# Patient Record
Sex: MALE | Employment: UNEMPLOYED | URBAN - NONMETROPOLITAN AREA
[De-identification: names, ages, dates, MRNs, and addresses within clinical notes are randomized per-mention and may not be internally consistent; named-entity substitution may affect disease eponyms.]

---

## 2024-08-14 ENCOUNTER — HOSPITAL ENCOUNTER (EMERGENCY)
Facility: HOSPITAL | Age: 69
Discharge: HOME | End: 2024-08-14
Attending: EMERGENCY MEDICINE
Payer: COMMERCIAL

## 2024-08-14 ENCOUNTER — APPOINTMENT (EMERGENCY)
Dept: CT IMAGING | Facility: HOSPITAL | Age: 69
End: 2024-08-14
Payer: COMMERCIAL

## 2024-08-14 VITALS
SYSTOLIC BLOOD PRESSURE: 128 MMHG | RESPIRATION RATE: 20 BRPM | TEMPERATURE: 98.6 F | OXYGEN SATURATION: 95 % | HEART RATE: 85 BPM | DIASTOLIC BLOOD PRESSURE: 80 MMHG

## 2024-08-14 DIAGNOSIS — K59.00 CONSTIPATION: ICD-10-CM

## 2024-08-14 DIAGNOSIS — R59.9 ENLARGED LYMPH NODES: ICD-10-CM

## 2024-08-14 DIAGNOSIS — K62.5 BRBPR (BRIGHT RED BLOOD PER RECTUM): Primary | ICD-10-CM

## 2024-08-14 DIAGNOSIS — K40.20 INGUINAL HERNIA, BILATERAL: ICD-10-CM

## 2024-08-14 DIAGNOSIS — N40.0 ENLARGED PROSTATE: ICD-10-CM

## 2024-08-14 PROBLEM — I10 BENIGN ESSENTIAL HTN: Status: ACTIVE | Noted: 2019-01-05

## 2024-08-14 PROBLEM — M25.541 JOINT PAIN IN FINGERS OF BOTH HANDS: Status: ACTIVE | Noted: 2020-01-23

## 2024-08-14 PROBLEM — M25.512 CHRONIC LEFT SHOULDER PAIN: Status: ACTIVE | Noted: 2020-01-23

## 2024-08-14 PROBLEM — M16.12 OSTEOARTHRITIS OF LEFT HIP: Status: ACTIVE | Noted: 2023-06-16

## 2024-08-14 PROBLEM — I10 HTN (HYPERTENSION): Status: ACTIVE | Noted: 2024-08-14

## 2024-08-14 PROBLEM — R97.20 ELEVATED PSA, BETWEEN 10 AND LESS THAN 20 NG/ML: Status: ACTIVE | Noted: 2020-06-09

## 2024-08-14 PROBLEM — G89.29 CHRONIC LEFT SHOULDER PAIN: Status: ACTIVE | Noted: 2020-01-23

## 2024-08-14 PROBLEM — R33.9 URINARY RETENTION: Status: ACTIVE | Noted: 2019-01-05

## 2024-08-14 PROBLEM — Z96.649 STATUS POST THR (TOTAL HIP REPLACEMENT): Status: ACTIVE | Noted: 2023-06-17

## 2024-08-14 PROBLEM — Z22.358 ESBL E. COLI CARRIER: Status: ACTIVE | Noted: 2023-05-12

## 2024-08-14 PROBLEM — M15.4 EROSIVE (OSTEO)ARTHRITIS: Status: ACTIVE | Noted: 2023-05-11

## 2024-08-14 PROBLEM — D64.9 ANEMIA: Status: ACTIVE | Noted: 2023-06-17

## 2024-08-14 PROBLEM — N20.2 CALCULUS OF KIDNEY WITH CALCULUS OF URETER: Status: ACTIVE | Noted: 2017-07-30

## 2024-08-14 PROBLEM — M25.542 JOINT PAIN IN FINGERS OF BOTH HANDS: Status: ACTIVE | Noted: 2020-01-23

## 2024-08-14 PROBLEM — R31.0 GROSS HEMATURIA: Status: ACTIVE | Noted: 2017-07-30

## 2024-08-14 LAB
2HR DELTA HS TROPONIN: -1 NG/L
ABO GROUP BLD: NORMAL
ABO GROUP BLD: NORMAL
ALBUMIN SERPL BCG-MCNC: 4.6 G/DL (ref 3.5–5)
ALP SERPL-CCNC: 58 U/L (ref 34–104)
ALT SERPL W P-5'-P-CCNC: 22 U/L (ref 7–52)
ANION GAP SERPL CALCULATED.3IONS-SCNC: 10 MMOL/L (ref 4–13)
APTT PPP: 28 SECONDS (ref 23–34)
AST SERPL W P-5'-P-CCNC: 18 U/L (ref 13–39)
BASOPHILS # BLD AUTO: 0.06 THOUSANDS/ÂΜL (ref 0–0.1)
BASOPHILS NFR BLD AUTO: 1 % (ref 0–1)
BILIRUB SERPL-MCNC: 0.74 MG/DL (ref 0.2–1)
BLD GP AB SCN SERPL QL: NEGATIVE
BUN SERPL-MCNC: 14 MG/DL (ref 5–25)
CALCIUM SERPL-MCNC: 9.4 MG/DL (ref 8.4–10.2)
CARDIAC TROPONIN I PNL SERPL HS: 3 NG/L
CARDIAC TROPONIN I PNL SERPL HS: 4 NG/L
CHLORIDE SERPL-SCNC: 102 MMOL/L (ref 96–108)
CO2 SERPL-SCNC: 27 MMOL/L (ref 21–32)
CREAT SERPL-MCNC: 0.82 MG/DL (ref 0.6–1.3)
EOSINOPHIL # BLD AUTO: 0.07 THOUSAND/ÂΜL (ref 0–0.61)
EOSINOPHIL NFR BLD AUTO: 1 % (ref 0–6)
ERYTHROCYTE [DISTWIDTH] IN BLOOD BY AUTOMATED COUNT: 14 % (ref 11.6–15.1)
GFR SERPL CREATININE-BSD FRML MDRD: 91 ML/MIN/1.73SQ M
GLUCOSE SERPL-MCNC: 105 MG/DL (ref 65–140)
HCT VFR BLD AUTO: 50.8 % (ref 36.5–49.3)
HGB BLD-MCNC: 16.5 G/DL (ref 12–17)
IMM GRANULOCYTES # BLD AUTO: 0.01 THOUSAND/UL (ref 0–0.2)
IMM GRANULOCYTES NFR BLD AUTO: 0 % (ref 0–2)
INR PPP: 0.91 (ref 0.85–1.19)
LYMPHOCYTES # BLD AUTO: 1.52 THOUSANDS/ÂΜL (ref 0.6–4.47)
LYMPHOCYTES NFR BLD AUTO: 22 % (ref 14–44)
MCH RBC QN AUTO: 28.7 PG (ref 26.8–34.3)
MCHC RBC AUTO-ENTMCNC: 32.5 G/DL (ref 31.4–37.4)
MCV RBC AUTO: 89 FL (ref 82–98)
MONOCYTES # BLD AUTO: 0.75 THOUSAND/ÂΜL (ref 0.17–1.22)
MONOCYTES NFR BLD AUTO: 11 % (ref 4–12)
NEUTROPHILS # BLD AUTO: 4.65 THOUSANDS/ÂΜL (ref 1.85–7.62)
NEUTS SEG NFR BLD AUTO: 65 % (ref 43–75)
NRBC BLD AUTO-RTO: 0 /100 WBCS
PLATELET # BLD AUTO: 244 THOUSANDS/UL (ref 149–390)
PMV BLD AUTO: 10.6 FL (ref 8.9–12.7)
POTASSIUM SERPL-SCNC: 3.7 MMOL/L (ref 3.5–5.3)
PROT SERPL-MCNC: 7.7 G/DL (ref 6.4–8.4)
PROTHROMBIN TIME: 12.8 SECONDS (ref 12.3–15)
RBC # BLD AUTO: 5.74 MILLION/UL (ref 3.88–5.62)
RH BLD: POSITIVE
RH BLD: POSITIVE
SODIUM SERPL-SCNC: 139 MMOL/L (ref 135–147)
SPECIMEN EXPIRATION DATE: NORMAL
WBC # BLD AUTO: 7.06 THOUSAND/UL (ref 4.31–10.16)

## 2024-08-14 PROCEDURE — 96368 THER/DIAG CONCURRENT INF: CPT

## 2024-08-14 PROCEDURE — 84484 ASSAY OF TROPONIN QUANT: CPT | Performed by: PHYSICIAN ASSISTANT

## 2024-08-14 PROCEDURE — 86850 RBC ANTIBODY SCREEN: CPT | Performed by: PHYSICIAN ASSISTANT

## 2024-08-14 PROCEDURE — 93005 ELECTROCARDIOGRAM TRACING: CPT

## 2024-08-14 PROCEDURE — 99285 EMERGENCY DEPT VISIT HI MDM: CPT | Performed by: PHYSICIAN ASSISTANT

## 2024-08-14 PROCEDURE — 85610 PROTHROMBIN TIME: CPT | Performed by: PHYSICIAN ASSISTANT

## 2024-08-14 PROCEDURE — 86900 BLOOD TYPING SEROLOGIC ABO: CPT | Performed by: PHYSICIAN ASSISTANT

## 2024-08-14 PROCEDURE — 86901 BLOOD TYPING SEROLOGIC RH(D): CPT | Performed by: PHYSICIAN ASSISTANT

## 2024-08-14 PROCEDURE — 85025 COMPLETE CBC W/AUTO DIFF WBC: CPT | Performed by: PHYSICIAN ASSISTANT

## 2024-08-14 PROCEDURE — 36415 COLL VENOUS BLD VENIPUNCTURE: CPT | Performed by: PHYSICIAN ASSISTANT

## 2024-08-14 PROCEDURE — 99285 EMERGENCY DEPT VISIT HI MDM: CPT

## 2024-08-14 PROCEDURE — 96365 THER/PROPH/DIAG IV INF INIT: CPT

## 2024-08-14 PROCEDURE — 74178 CT ABD&PLV WO CNTR FLWD CNTR: CPT

## 2024-08-14 PROCEDURE — 80053 COMPREHEN METABOLIC PANEL: CPT | Performed by: PHYSICIAN ASSISTANT

## 2024-08-14 PROCEDURE — 85730 THROMBOPLASTIN TIME PARTIAL: CPT | Performed by: PHYSICIAN ASSISTANT

## 2024-08-14 PROCEDURE — 96366 THER/PROPH/DIAG IV INF ADDON: CPT

## 2024-08-14 RX ORDER — FINASTERIDE 5 MG/1
5 TABLET, FILM COATED ORAL DAILY
COMMUNITY
Start: 2024-07-30 | End: 2025-07-30

## 2024-08-14 RX ORDER — SODIUM CHLORIDE, SODIUM GLUCONATE, SODIUM ACETATE, POTASSIUM CHLORIDE, MAGNESIUM CHLORIDE, SODIUM PHOSPHATE, DIBASIC, AND POTASSIUM PHOSPHATE .53; .5; .37; .037; .03; .012; .00082 G/100ML; G/100ML; G/100ML; G/100ML; G/100ML; G/100ML; G/100ML
1000 INJECTION, SOLUTION INTRAVENOUS ONCE
Status: COMPLETED | OUTPATIENT
Start: 2024-08-14 | End: 2024-08-14

## 2024-08-14 RX ORDER — IRBESARTAN AND HYDROCHLOROTHIAZIDE 150; 12.5 MG/1; MG/1
1 TABLET, FILM COATED ORAL EVERY MORNING
COMMUNITY

## 2024-08-14 RX ORDER — DOCUSATE SODIUM 100 MG/1
100 CAPSULE, LIQUID FILLED ORAL EVERY 12 HOURS
Qty: 60 CAPSULE | Refills: 0 | Status: SHIPPED | OUTPATIENT
Start: 2024-08-14

## 2024-08-14 RX ORDER — POLYETHYLENE GLYCOL 3350 17 G/17G
17 POWDER, FOR SOLUTION ORAL DAILY
Qty: 510 G | Refills: 0 | Status: SHIPPED | OUTPATIENT
Start: 2024-08-14

## 2024-08-14 RX ADMIN — IOHEXOL 100 ML: 350 INJECTION, SOLUTION INTRAVENOUS at 11:46

## 2024-08-14 RX ADMIN — PANTOPRAZOLE SODIUM 80 MG: 40 INJECTION, POWDER, LYOPHILIZED, FOR SOLUTION INTRAVENOUS at 11:39

## 2024-08-14 RX ADMIN — SODIUM CHLORIDE, SODIUM GLUCONATE, SODIUM ACETATE, POTASSIUM CHLORIDE, MAGNESIUM CHLORIDE, SODIUM PHOSPHATE, DIBASIC, AND POTASSIUM PHOSPHATE 1000 ML: .53; .5; .37; .037; .03; .012; .00082 INJECTION, SOLUTION INTRAVENOUS at 11:16

## 2024-08-14 NOTE — DISCHARGE INSTRUCTIONS
I have placed a referral to GI for follow up.  Increase fiber and fluids in the diet.  Take stool softener and miralax to help with constipation.  Follow up with your PCP and urologist as well.

## 2024-08-14 NOTE — INCIDENTAL FINDINGS
The following findings require follow up:  Radiographic finding   Finding:   -Mildly enlarged/prominent mesorectal and pelvic sidewall lymph nodes as described, indeterminate. If not recently performed, colonoscopy may be useful to rule out occult colorectal neoplasm.  -Markedly enlarged prostate.  -Moderate bilateral fat-containing inguinal hernias.  -Partially imaged right hydrocele.   Follow up required: GI referral, follow up with his urologist   Follow up should be done within 2 week(s)    Please notify the following clinician to assist with the follow up:  -GI office   -Dr. Kapoor (his urologist)    Incidental finding results were discussed with the Patient by Isatu Solis PA-C on 08/14/24.   They expressed understanding and all questions answered.

## 2024-08-14 NOTE — ED PROVIDER NOTES
History  Chief Complaint   Patient presents with    Rectal Bleeding     Sent here from [facility] for Eval, states that he has been having blood in his stools for the past 15 days, states that he had a colonoscopy done 2 years ago and was told that he has precancerous polyps.  Feels dizzy on occasion. Denies any pain. Denies any nausea vomiting or diarrhea.      69 year old male with PMH HTN, BPH, h/o ESBL E.coli presenting ambulatory from home for evaluation of blood in his stool.  History obtained through [employer] translation service.  He reports this has been occurring with each bowel movement over the past 15 days.  He notes most recent episode was this morning.  Shows me a picture on his cell phone in which there was a small lump of brown stool but with bright red tinged water noted in water around it.  He denies nausea, vomiting, diarrhea, constipation or abdominal pain.  Denies fever, chills.  Denies chest pain, SOB.  Denies cough, congestion or recent illness.  He notes he experiences intermittent dizziness at times.  No reported syncope.  No reported recent falls.  He denies aspirin or blood thinners.  Denies any difficultly urinating since having his prostate surgery.  He does follow with urology and saw them last month.  No noted hematuria.  He notes having a colonoscopy about 2 years ago in [place] and was told he had polyps at that time.  He denies any hearburn or reflux but reports he had a sour taste in his mouth earlier today.  No reported aggravating or alleviating factors.  He reports his PCP advised him to come to ER for further evaluation.      History provided by:  Patient and medical records   used: Yes        Prior to Admission Medications   Prescriptions Last Dose Informant Patient Reported? Taking?   finasteride (PROSCAR) 5 mg tablet   Yes Yes   Sig: Take 5 mg by mouth daily   irbesartan-hydrochlorothiazide (AVALIDE) 150-12.5 MG per tablet   Yes No   Sig: Take 1 tablet by  mouth every morning      Facility-Administered Medications: None       History reviewed. No pertinent past medical history.    History reviewed. No pertinent surgical history.    History reviewed. No pertinent family history.  I have reviewed and agree with the history as documented.    E-Cigarette/Vaping     E-Cigarette/Vaping Substances     Social History     Tobacco Use    Smoking status: Never    Smokeless tobacco: Never       Review of Systems   Constitutional: Negative.  Negative for chills, fatigue and fever.   HENT: Negative.  Negative for congestion, rhinorrhea and sore throat.    Eyes: Negative.  Negative for visual disturbance.   Respiratory: Negative.  Negative for cough, shortness of breath and wheezing.    Cardiovascular: Negative.  Negative for chest pain, palpitations and leg swelling.   Gastrointestinal:  Positive for blood in stool. Negative for abdominal pain, constipation, diarrhea, nausea and vomiting.   Genitourinary: Negative.  Negative for dysuria, flank pain, frequency and hematuria.   Musculoskeletal: Negative.  Negative for back pain and neck pain.   Skin: Negative.  Negative for rash.   Neurological:  Positive for dizziness and light-headedness. Negative for syncope, numbness and headaches.   Psychiatric/Behavioral: Negative.     All other systems reviewed and are negative.      Physical Exam  Physical Exam  Vitals and nursing note reviewed. Exam conducted with a chaperone present (Constanza SMITH).   Constitutional:       General: He is awake. He is not in acute distress.     Appearance: Normal appearance. He is well-developed. He is obese. He is not toxic-appearing or diaphoretic.   HENT:      Head: Normocephalic and atraumatic.      Right Ear: Hearing and external ear normal.      Left Ear: Hearing and external ear normal.      Nose: Nose normal.      Mouth/Throat:      Mouth: Mucous membranes are moist.      Pharynx: Oropharynx is clear. Uvula midline.   Eyes:      General: Lids are  normal. No scleral icterus.     Conjunctiva/sclera: Conjunctivae normal.      Pupils: Pupils are equal, round, and reactive to light.   Neck:      Trachea: Trachea and phonation normal.   Cardiovascular:      Rate and Rhythm: Normal rate and regular rhythm.      Pulses: Normal pulses.           Radial pulses are 2+ on the right side and 2+ on the left side.        Dorsalis pedis pulses are 2+ on the right side and 2+ on the left side.        Posterior tibial pulses are 2+ on the right side and 2+ on the left side.      Heart sounds: Normal heart sounds, S1 normal and S2 normal. No murmur heard.  Pulmonary:      Effort: Pulmonary effort is normal. No tachypnea or respiratory distress.      Breath sounds: Normal breath sounds. No wheezing, rhonchi or rales.   Abdominal:      General: Bowel sounds are normal. There is no distension.      Palpations: Abdomen is soft.      Tenderness: There is no abdominal tenderness. There is no right CVA tenderness, left CVA tenderness, guarding or rebound.   Genitourinary:     Rectum: External hemorrhoid present. No anal fissure. Normal anal tone.      Comments: No noted external hemorrhage or overt bleeding.  There are non-thrombosed external hemorrhoids.  No noted melena or hematochezia but there was trace positive guaiac on hemoccult bedside testing, controls intact.  Musculoskeletal:      Cervical back: Normal range of motion and neck supple.      Right lower leg: No edema.      Left lower leg: No edema.   Skin:     General: Skin is warm and dry.      Capillary Refill: Capillary refill takes less than 2 seconds.      Findings: No rash.   Neurological:      General: No focal deficit present.      Mental Status: He is alert and oriented to person, place, and time.      GCS: GCS eye subscore is 4. GCS verbal subscore is 5. GCS motor subscore is 6.      Gait: Gait normal.   Psychiatric:         Mood and Affect: Mood normal.         Speech: Speech normal.         Behavior: Behavior  normal. Behavior is cooperative.         Vital Signs  ED Triage Vitals [08/14/24 1042]   Temperature Pulse Respirations Blood Pressure SpO2   98.6 °F (37 °C) 83 18 139/85 95 %      Temp Source Heart Rate Source Patient Position - Orthostatic VS BP Location FiO2 (%)   Temporal Monitor Sitting Right arm --      Pain Score       No Pain           Vitals:    08/14/24 1042 08/14/24 1215 08/14/24 1230 08/14/24 1300   BP: 139/85 126/66 147/86 128/80   Pulse: 83 86 80 85   Patient Position - Orthostatic VS: Sitting  Sitting Sitting         Visual Acuity      ED Medications  Medications   multi-electrolyte (ISOLYTE-S PH 7.4) bolus 1,000 mL (0 mL Intravenous Stopped 8/14/24 1249)   pantoprazole (PROTONIX) 80 mg in sodium chloride 0.9 % 100 mL IVPB (0 mg Intravenous Stopped 8/14/24 1213)   iohexol (OMNIPAQUE) 350 MG/ML injection (MULTI-DOSE) 100 mL (100 mL Intravenous Given 8/14/24 1146)       Diagnostic Studies  Results Reviewed       Procedure Component Value Units Date/Time    HS Troponin I 2hr [[id number]]  (Normal) Collected: 08/14/24 1318    Lab Status: Final result Specimen: Blood from Arm, Left Updated: 08/14/24 1347     hs TnI 2hr 3 ng/L      Delta 2hr hsTnI -1 ng/L     HS Troponin I 4hr [[id number]]     Lab Status: No result Specimen: Blood     HS Troponin 0hr (reflex protocol) [[id number]]  (Normal) Collected: 08/14/24 1110    Lab Status: Final result Specimen: Blood from Arm, Left Updated: 08/14/24 1143     hs TnI 0hr 4 ng/L     Comprehensive metabolic panel [[id number]] Collected: 08/14/24 1110    Lab Status: Final result Specimen: Blood from Arm, Left Updated: 08/14/24 1138     Sodium 139 mmol/L      Potassium 3.7 mmol/L      Chloride 102 mmol/L      CO2 27 mmol/L      ANION GAP 10 mmol/L      BUN 14 mg/dL      Creatinine 0.82 mg/dL      Glucose 105 mg/dL      Calcium 9.4 mg/dL      AST 18 U/L      ALT 22 U/L      Alkaline Phosphatase 58 U/L      Total Protein 7.7 g/dL      Albumin 4.6 g/dL      Total Bilirubin 0.74  mg/dL      eGFR 91 ml/min/1.73sq m     Narrative:      National Kidney Disease Foundation guidelines for Chronic Kidney Disease (CKD):     Stage 1 with normal or high GFR (GFR > 90 mL/min/1.73 square meters)    Stage 2 Mild CKD (GFR = 60-89 mL/min/1.73 square meters)    Stage 3A Moderate CKD (GFR = 45-59 mL/min/1.73 square meters)    Stage 3B Moderate CKD (GFR = 30-44 mL/min/1.73 square meters)    Stage 4 Severe CKD (GFR = 15-29 mL/min/1.73 square meters)    Stage 5 End Stage CKD (GFR <15 mL/min/1.73 square meters)  Note: GFR calculation is accurate only with a steady state creatinine    Protime-INR [[id number]]  (Normal) Collected: 08/14/24 1110    Lab Status: Final result Specimen: Blood from Arm, Left Updated: 08/14/24 1131     Protime 12.8 seconds      INR 0.91    Narrative:      INR Therapeutic Range    Indication                                             INR Range      Atrial Fibrillation                                               2.0-3.0  Hypercoagulable State                                    2.0.2.3  Left Ventricular Asist Device                            2.0-3.0  Mechanical Heart Valve                                  -    Aortic(with afib, MI, embolism, HF, LA enlargement,    and/or coagulopathy)                                     2.0-3.0 (2.5-3.5)     Mitral                                                             2.5-3.5  Prosthetic/Bioprosthetic Heart Valve               2.0-3.0  Venous thromboembolism (VTE: VT, PE        2.0-3.0    APTT [[id number]]  (Normal) Collected: 08/14/24 1110    Lab Status: Final result Specimen: Blood from Arm, Left Updated: 08/14/24 1131     PTT 28 seconds     CBC and differential [[id number]]  (Abnormal) Collected: 08/14/24 1110    Lab Status: Final result Specimen: Blood from Arm, Left Updated: 08/14/24 1118     WBC 7.06 Thousand/uL      RBC 5.74 Million/uL      Hemoglobin 16.5 g/dL      Hematocrit 50.8 %      MCV 89 fL      MCH 28.7 pg      MCHC 32.5 g/dL      RDW  14.0 %      MPV 10.6 fL      Platelets 244 Thousands/uL      nRBC 0 /100 WBCs      Segmented % 65 %      Immature Grans % 0 %      Lymphocytes % 22 %      Monocytes % 11 %      Eosinophils Relative 1 %      Basophils Relative 1 %      Absolute Neutrophils 4.65 Thousands/µL      Absolute Immature Grans 0.01 Thousand/uL      Absolute Lymphocytes 1.52 Thousands/µL      Absolute Monocytes 0.75 Thousand/µL      Eosinophils Absolute 0.07 Thousand/µL      Basophils Absolute 0.06 Thousands/µL                    CT high volume bleeding scan abdomen pelvis   Final Result by Raf Xiao MD (08/14 1236)      1.  No CT evidence of active high-volume gastrointestinal hemorrhage.   2.  Moderate stool burden within the distal sigmoid colon and rectum suggestive of constipation.   3.  Mildly enlarged/prominent mesorectal and pelvic sidewall lymph nodes as described, indeterminate. If not recently performed, colonoscopy may be useful to rule out occult colorectal neoplasm.   4.  Markedly enlarged prostate.   5.  Moderate bilateral fat-containing inguinal hernias.   6.  Partially imaged right hydrocele.      The study was marked in EPIC for immediate notification.      Workstation performed: [id number]                    Procedures  ECG 12 Lead Documentation Only    Date/Time: 8/14/2024 11:12 AM    Performed by: Isatu Solis PA-C  Authorized by: Isatu Solis PA-C    Indications / Diagnosis:  Dizziness  ECG reviewed by me, the ED Provider: yes    Patient location:  ED  Previous ECG:     Comparison to cardiac monitor: Yes    Interpretation:     Interpretation: normal    Rate:     ECG rate:  81    ECG rate assessment: normal    Rhythm:     Rhythm: sinus rhythm    Ectopy:     Ectopy: none    QRS:     QRS axis:  Normal    QRS intervals:  Normal  Conduction:     Conduction: normal    ST segments:     ST segments:  Normal  T waves:     T waves: normal    Comments:      , QRS 84, QT//450; no acute ischemic  changes.           ED Course  ED Course as of 08/14/24 1602   Wed Aug 14, 2024   1046 #[id number]   1121 WBC: 7.06   1121 Hemoglobin: 16.5  Stable, improved.  Value of 11.6 a year ago; on outside labs value was 15.7 on 7/12/24.   1122 Platelet Count: 244   1132 POCT INR: 0.91   1132 PROTIME: 12.8   1132 PTT: 28   1139 GLUCOSE: 105   1139 Creatinine: 0.82   1139 BUN: 14   1139 Sodium: 139   1139 Potassium: 3.7   1139 Chloride: 102   1139 Carbon Dioxide: 27   1139 ANION GAP: 10   1139 Calcium: 9.4   1139 AST: 18   1139 ALT: 22   1139 ALK PHOS: 58   1139 Total Protein: 7.7   1139 Albumin: 4.6   1139 Total Bilirubin: 0.74   1139 GFR, Calculated: 91   1148 hs TnI 0hr: 4   1215 CT performed and pending interpretation, on my prelim interpretation, no noted obstruction or free air, appears to have large stool burden/constipation, no noted diverticulitis or colitis, prostate appears enlarged.   1239 CT high volume bleeding scan abdomen pelvis  IMPRESSION:     1.  No CT evidence of active high-volume gastrointestinal hemorrhage.  2.  Moderate stool burden within the distal sigmoid colon and rectum suggestive of constipation.  3.  Mildly enlarged/prominent mesorectal and pelvic sidewall lymph nodes as described, indeterminate. If not recently performed, colonoscopy may be useful to rule out occult colorectal neoplasm.  4.  Markedly enlarged prostate.  5.  Moderate bilateral fat-containing inguinal hernias.  6.  Partially imaged right hydrocele.   1327 #[id number]; pt updated on results of CT scan.  Incidental findings reviewed.  Pt well appearing, abdomen remains soft, non tender.  No further episodes.  Discussed treatment of his constipation and outpatient follow up with GI to discuss further scopes.  He already follows with urology in regards to his prostate issues.   1358 Delta 2hr hsTnI: -1               HEART Risk Score      Flowsheet Row Most Recent Value   Heart Score Risk Calculator    History 0 Filed at: 08/14/2024 1121   ECG  0 Filed at: 08/14/2024 1121   Age 2 Filed at: 08/14/2024 1121   Risk Factors 1 Filed at: 08/14/2024 1121   Troponin --   HEART Score --                          SBIRT 20yo+      Flowsheet Row Most Recent Value   Initial Alcohol Screen: US AUDIT-C     1. How often do you have a drink containing alcohol? 0 Filed at: 08/14/2024 1044   2. How many drinks containing alcohol do you have on a typical day you are drinking?  0 Filed at: 08/14/2024 1044   3a. Male UNDER 65: How often do you have five or more drinks on one occasion? 0 Filed at: 08/14/2024 1044   Audit-C Score 0 Filed at: 08/14/2024 1044   SORAYA: How many times in the past year have you...    Used an illegal drug or used a prescription medication for non-medical reasons? Never Filed at: 08/14/2024 1044                      Medical Decision Making  70 yo male presenting for evaluation of rectal bleeding. Will obtain EKG, troponin given intermittent dizziness.  Will check labs and obtain CT imaging to further evaluate.  He has a non focal exam.    Work up obtained as noted above.  EKG and serial troponins not c/w ACS.  No noted leukocytosis, no anemia.  No infectious concerns.  No hypo or hyperglycemia.  Renal function within normal limits.  Electrolytes within normal limits.  CT imaging was obtained which showed no acute findings.  There was no evidence of high volume bleeding.  Pt not exhibiting any findings of upper GI bleeding.  There is noted constipation.  Reviewed dietary guidance and recommended increased fiber, fluids.  Will provide colace and miralax to help move his bowels.  Incidental findings reviewed.  He did have a colonoscopy 2 years ago, discussed a repeat scope may be needed especially with reported history of polyps.  There are mildly enlarged lymph nodes.  Prostate is enlarged, he already follows with urology and tells me he's had prior biopsies which were negative.  Pt afebrile, well appearing, hemodynamically stable.  Abdomen soft, non  tender.  He does not appear to have any significant bleeding.  This appears potentially related to hemorrhoids and constipation.  Pt felt appropriate for discharge with symptomatic management and strict return precautions.  Pt comfortable with plan of care. All questions answered.  Will provide outpatient referral to GI.    Reviewed symptomatic management.  OTC meds reviewed.  Anticipatory guidance.  Advised recheck with PCP or return to ER as needed.  Strict return precautions outlined.  Patient voiced understanding and had no further questions.    Please refer to above ER course for further details/discussion.      Problems Addressed:  BRBPR (bright red blood per rectum): acute illness or injury  Constipation: acute illness or injury  Enlarged lymph nodes: acute illness or injury     Details: Unclear significance, discussed need for further outpatient follow up.  Enlarged prostate: chronic illness or injury     Details: Chronic, follows with urology.  Inguinal hernia, bilateral: acute illness or injury     Details: Fat containing. Asymptomatic.    Amount and/or Complexity of Data Reviewed  External Data Reviewed: labs, radiology and notes.  Labs: ordered. Decision-making details documented in ED Course.  Radiology: ordered and independent interpretation performed. Decision-making details documented in ED Course.  ECG/medicine tests: ordered and independent interpretation performed. Decision-making details documented in ED Course.    Risk  OTC drugs.  Prescription drug management.  Decision regarding hospitalization.                 Disposition  Final diagnoses:   BRBPR (bright red blood per rectum)   Constipation   Enlarged lymph nodes - Mildly enlarged/prominent mesorectal and pelvic sidewall lymph nodes as described, indeterminate. If not recently performed, colonoscopy may be useful to rule out occult colorectal neoplasm.   Enlarged prostate   Inguinal hernia, bilateral - Moderate bilateral fat-containing  inguinal hernias.     Time reflects when diagnosis was documented in both MDM as applicable and the Disposition within this note       Time User Action Codes Description Comment    8/14/2024  1:20 PM Isatu Solis [K62.5] BRBPR (bright red blood per rectum)     8/14/2024  1:20 PM OlIsatu yanez Add [K59.00] Constipation     8/14/2024  1:21 PM OlIsatu yanez Add [R59.9] Enlarged lymph nodes     8/14/2024  1:21 PM Olving, Isatu Modify [R59.9] Enlarged lymph nodes Mildly enlarged/prominent mesorectal and pelvic sidewall lymph nodes as described, indeterminate. If not recently performed, colonoscopy may be useful to rule out occult colorectal neoplasm.    8/14/2024  1:21 PM Isatu Solis [N40.0] Enlarged prostate     8/14/2024  1:22 PM OlIsatu yanez Add [K40.20] Inguinal hernia, bilateral     8/14/2024  1:22 PM OlIsatu yanez Modify [K40.20] Inguinal hernia, bilateral Moderate bilateral fat-containing inguinal hernias.          ED Disposition       ED Disposition   Discharge    Condition   Stable    Date/Time   Wed Aug 14, 2024 1337    Comment   Reed Denise discharge to home/self care.                   Follow-up Information       Follow up With Specialties Details Why Contact Info Additional Information    [facility] Emergency Department Emergency Medicine  As needed [address] [facility] [address]  [phone] [facility] Emergency Department, [address], [place], [address]    [facility] Schedule an appointment as soon as possible for a visit   [address] [place] [phone] [facility] [place], [address] [place] [address], [phone]            Discharge Medication List as of 8/14/2024  1:41 PM        START taking these medications    Details   docusate sodium (COLACE) 100 mg capsule Take 1  capsule (100 mg total) by mouth every 12 (twelve) hours, Starting Wed 8/14/2024, Normal      polyethylene glycol (GLYCOLAX) 17 GM/SCOOP powder Take 17 g by mouth daily, Starting Wed 8/14/2024, Normal           CONTINUE these medications which have NOT CHANGED    Details   finasteride (PROSCAR) 5 mg tablet Take 5 mg by mouth daily, Starting Tue 7/30/2024, Until Wed 7/30/2025, Historical Med      irbesartan-hydrochlorothiazide (AVALIDE) 150-12.5 MG per tablet Take 1 tablet by mouth every morning, Historical Med                 PDMP Review       None            ED Provider  Electronically Signed by             Isatu Solis PA-C  08/14/24 7470

## 2024-08-16 LAB
ATRIAL RATE: 81 BPM
P AXIS: 62 DEGREES
PR INTERVAL: 162 MS
QRS AXIS: 45 DEGREES
QRSD INTERVAL: 84 MS
QT INTERVAL: 388 MS
QTC INTERVAL: 450 MS
T WAVE AXIS: 31 DEGREES
VENTRICULAR RATE: 81 BPM

## 2024-08-16 PROCEDURE — 93010 ELECTROCARDIOGRAM REPORT: CPT | Performed by: INTERNAL MEDICINE
